# Patient Record
Sex: FEMALE | Race: WHITE | Employment: FULL TIME | URBAN - METROPOLITAN AREA
[De-identification: names, ages, dates, MRNs, and addresses within clinical notes are randomized per-mention and may not be internally consistent; named-entity substitution may affect disease eponyms.]

---

## 2019-05-26 ENCOUNTER — HOSPITAL ENCOUNTER (EMERGENCY)
Dept: GENERAL RADIOLOGY | Age: 44
Discharge: HOME | End: 2019-05-26
Attending: EMERGENCY MEDICINE
Payer: COMMERCIAL

## 2019-05-26 ENCOUNTER — HOSPITAL ENCOUNTER (EMERGENCY)
Age: 44
Discharge: HOME | End: 2019-05-26
Attending: EMERGENCY MEDICINE
Payer: COMMERCIAL

## 2019-05-26 VITALS
RESPIRATION RATE: 16 BRPM | TEMPERATURE: 97.9 F | OXYGEN SATURATION: 99 % | WEIGHT: 131 LBS | SYSTOLIC BLOOD PRESSURE: 136 MMHG | HEART RATE: 73 BPM | BODY MASS INDEX: 24.11 KG/M2 | DIASTOLIC BLOOD PRESSURE: 79 MMHG | HEIGHT: 62 IN

## 2019-05-26 DIAGNOSIS — M54.50 ACUTE RIGHT-SIDED LOW BACK PAIN WITHOUT SCIATICA: Primary | ICD-10-CM

## 2019-05-26 PROCEDURE — 72100 X-RAY EXAM L-S SPINE 2/3 VWS: CPT

## 2019-05-26 PROCEDURE — 99282 EMERGENCY DEPT VISIT SF MDM: CPT

## 2019-05-26 PROCEDURE — 73502 X-RAY EXAM HIP UNI 2-3 VIEWS: CPT

## 2019-05-26 RX ORDER — METHYLPREDNISOLONE 4 MG/1
TABLET ORAL
Qty: 1 DOSE PACK | Refills: 0 | Status: SHIPPED | OUTPATIENT
Start: 2019-05-26

## 2019-05-26 RX ORDER — ZINC GLUCONATE 10 MG
LOZENGE ORAL
COMMUNITY

## 2019-05-26 RX ORDER — HYDROCODONE BITARTRATE AND ACETAMINOPHEN 5; 325 MG/1; MG/1
1 TABLET ORAL
Qty: 18 TAB | Refills: 0 | Status: SHIPPED | OUTPATIENT
Start: 2019-05-26 | End: 2019-05-29

## 2019-05-26 RX ORDER — BISMUTH SUBSALICYLATE 262 MG
1 TABLET,CHEWABLE ORAL DAILY
COMMUNITY

## 2019-05-26 NOTE — ED NOTES
The patient was discharged by Dr Joshua [facility]. Patient wheeled out of ED for discharge.

## 2019-05-26 NOTE — DISCHARGE INSTRUCTIONS
Patient Education        Back Pain: Care Instructions  Your Care Instructions    Back pain has many possible causes. It is often related to problems with muscles and ligaments of the back. It may also be related to problems with the nerves, discs, or bones of the back. Moving, lifting, standing, sitting, or sleeping in an awkward way can strain the back. Sometimes you don't notice the injury until later. Arthritis is another common cause of back pain. Although it may hurt a lot, back pain usually improves on its own within several weeks. Most people recover in 12 weeks or less. Using good home treatment and being careful not to stress your back can help you feel better sooner. Follow-up care is a key part of your treatment and safety. Be sure to make and go to all appointments, and call your doctor if you are having problems. It's also a good idea to know your test results and keep a list of the medicines you take. How can you care for yourself at home? · Sit or lie in positions that are most comfortable and reduce your pain. Try one of these positions when you lie down:  ? Lie on your back with your knees bent and supported by large pillows. ? Lie on the floor with your legs on the seat of a sofa or chair. ? Lie on your side with your knees and hips bent and a pillow between your legs. ? Lie on your stomach if it does not make pain worse. · Do not sit up in bed, and avoid soft couches and twisted positions. Bed rest can help relieve pain at first, but it delays healing. Avoid bed rest after the first day of back pain. · Change positions every 30 minutes. If you must sit for long periods of time, take breaks from sitting. Get up and walk around, or lie in a comfortable position. · Try using a heating pad on a low or medium setting for 15 to 20 minutes every 2 or 3 hours. Try a warm shower in place of one session with the heating pad. · You can also try an ice pack for 10 to 15 minutes every 2 to 3 hours. Put a thin cloth between the ice pack and your skin. · Take pain medicines exactly as directed. ? If the doctor gave you a prescription medicine for pain, take it as prescribed. ? If you are not taking a prescription pain medicine, ask your doctor if you can take an over-the-counter medicine. · Take short walks several times a day. You can start with 5 to 10 minutes, 3 or 4 times a day, and work up to longer walks. Walk on level surfaces and avoid hills and stairs until your back is better. · Return to work and other activities as soon as you can. Continued rest without activity is usually not good for your back. · To prevent future back pain, do exercises to stretch and strengthen your back and stomach. Learn how to use good posture, safe lifting techniques, and proper body mechanics. When should you call for help? Call your doctor now or seek immediate medical care if:    · You have new or worsening numbness in your legs.     · You have new or worsening weakness in your legs. (This could make it hard to stand up.)     · You lose control of your bladder or bowels.    Watch closely for changes in your health, and be sure to contact your doctor if:    · You have a fever, lose weight, or don't feel well.     · You do not get better as expected. Where can you learn more? Go to http://monet-izzy.info/. Enter T840 in the search box to learn more about \"Back Pain: Care Instructions. \"  Current as of: September 20, 2018  Content Version: 11.9  © 9302-9100 [employer]. Care instructions adapted under license by [employer] (which disclaims liability or warranty for this information). If you have questions about a medical condition or this instruction, always ask your healthcare professional. [place] [address] any warranty or liability for your use of this information.

## 2019-05-26 NOTE — ED PROVIDER NOTES
Pt. Presents to the ER with low back/hip pain that started last night. Pt. [place] to  her daughter, who is 3, because the daughter was trying to pee outside in the back year. Pt. Scooped up her daughter and ran up the stairs with her. Pt. Says that by the time she got to the top step, her right lower back/hip began to hurt. Pt. Had similar pain 3 year ago when she was pregnant. Her pain has been constant since it started last night. Pt. Took an oxycodone which did not seem to help with the pain. No falls. No other injuries. Pt. Is able to walk, but she complains of pain when she walks  No changes with her urine or bowel movements. No new numbness, tingling or weakness.              Past Medical History:   Diagnosis Date    Abnormal Pap smear     hpv- 1st occurance leep 2nd occurance colpo     Abnormal Papanicolaou smear of cervix 2002     years ago HPV, cryo and colpo ()    Herpes simplex without mention of complication     cold sores-years ago    Infertility, female     ivf conception, G2 conceived naturally       Past Surgical History:   Procedure Laterality Date     DELIVERY ONLY  2014    Previa    HX OTHER SURGICAL      mold spores removed at the age of  17   Bernetta Marking OTHER SURGICAL      egg retrieval in 2013         Family History:   Problem Relation Age of Onset    Cancer Mother         ovarian cancer     Heart Disease Maternal Grandmother     Cancer Maternal Grandfather         testicular ca and blood and bone     Heart Disease Paternal Grandmother     Cancer Paternal Grandmother         lung ca       Social History     Socioeconomic History    Marital status:      Spouse name: Not on file    Number of children: Not on file    Years of education: Not on file    Highest education level: Not on file   Occupational History    Not on file   Social Needs    Financial resource strain: Not on file    Food insecurity:     Worry: Not on file     Inability: Not on file   Lsisette Awe Transportation needs:     Medical: Not on file     Non-medical: Not on file   Tobacco Use    Smoking status: Never Smoker    Smokeless tobacco: Never Used   Substance and Sexual Activity    Alcohol use: No    Drug use: No    Sexual activity: Yes     Partners: Male     Birth control/protection: Pill   Lifestyle    Physical activity:     Days per week: Not on file     Minutes per session: Not on file    Stress: Not on file   Relationships    Social connections:     Talks on phone: Not on file     Gets together: Not on file     Attends Tenriism service: Not on file     Active member of club or organization: Not on file     Attends meetings of clubs or organizations: Not on file     Relationship status: Not on file    Intimate partner violence:     Fear of current or ex partner: Not on file     Emotionally abused: Not on file     Physically abused: Not on file     Forced sexual activity: Not on file   Other Topics Concern    Not on file   Social History Narrative    Not on file         ALLERGIES: Nuts [tree nut] and Gluten    Review of Systems   Constitutional: Negative for chills and fever. HENT: Negative for rhinorrhea and sore throat. Respiratory: Negative for cough and shortness of breath. Cardiovascular: Negative for chest pain. Gastrointestinal: Negative for abdominal pain, diarrhea, nausea and vomiting. Genitourinary: Negative for dysuria and urgency. Musculoskeletal: Positive for back pain. Hip pain     Skin: Negative for rash. Neurological: Negative for dizziness, weakness and light-headedness. Vitals:    05/26/19 0915   BP: 136/79   Pulse: 73   Resp: 16   Temp: 97.9 °F (36.6 °C)   SpO2: 99%   Weight: 59.4 kg (131 lb)   Height: 5' 2\" (1.575 m)            Physical Exam     Vital signs reviewed. Nursing notes reviewed.     Const:  No acute distress, well developed, well nourished  Head:  Atraumatic, normocephalic  Eyes:  PERRL, conjunctiva normal, no scleral icterus  Neck: Supple, trachea midline  Cardiovascular:  RRR, no murmurs, no gallops, no rubs  Resp:  No resp distress, no increased work of breathing, no wheezes, no rhonchi, no rales,  Abd:  Soft, non-tender, non-distended, no rebound, no guarding, no CVA tenderness  MSK:  Focal tenderness over right SI joint, no tenderness over the midline L spine or lateral hip, pain improves with flexion at the hip  Neuro:  Alert and oriented x3, no cranial nerve defect  Skin:  Warm, dry, intact  Psych: normal mood and affect, behavior is normal, judgement and thought content is normal          MDM  Number of Diagnoses or Management Options  Acute right-sided low back pain without sciatica:      Amount and/or Complexity of Data Reviewed  Tests in the radiology section of CPT®: reviewed and ordered  Review and summarize past medical records: yes    Patient Progress  Patient progress: stable          Pt. Presents to the ER with pain at her SI joint. No fx on xray. No signs/sx of cord compression. Pt. Is well appearing in the ER. I will start her on norco and a medrol dosepak. Pt. To f/u with her PCP or return to the ER with worsening sx.       Procedures

## 2019-05-26 NOTE — ED TRIAGE NOTES
Last night pt lifted up her 1year old and after walking up 12 stairs she had pain in her right hip. She has difficulty standing and sitting. She reports the pain being sharp. She reports having a right hip issue while pregnant. She took some Oxycodone left over from a  with no relief.

## 2024-11-05 ENCOUNTER — OFFICE VISIT (OUTPATIENT)
Age: 49
End: 2024-11-05

## 2024-11-05 VITALS
BODY MASS INDEX: 25.76 KG/M2 | RESPIRATION RATE: 15 BRPM | WEIGHT: 140 LBS | SYSTOLIC BLOOD PRESSURE: 121 MMHG | OXYGEN SATURATION: 96 % | TEMPERATURE: 98.4 F | HEART RATE: 73 BPM | HEIGHT: 62 IN | DIASTOLIC BLOOD PRESSURE: 84 MMHG

## 2024-11-05 DIAGNOSIS — J02.9 ACUTE VIRAL PHARYNGITIS: Primary | ICD-10-CM

## 2024-11-05 LAB — S PYO AG THROAT QL: NORMAL

## 2024-11-05 RX ORDER — NORGESTIMATE AND ETHINYL ESTRADIOL
KIT
COMMUNITY
Start: 2024-11-05

## 2024-11-05 NOTE — PATIENT INSTRUCTIONS
Thank you for visiting [facility] today.    Limit your exposure to allergens such as pollen, dust, animal dander and cigarette smoke  Use an air purifier or air conditioner in the home  Flonase or Nasonex may be used twice daily in each nostril  Claritin, Allegra, Xyzal or Zyrtec every 12 -24 hours  Allergist referral if no improvement in symptoms within 5-7 days

## 2024-11-08 PROBLEM — Z80.41 FAMILY HISTORY OF MALIGNANT NEOPLASM OF OVARY: Status: ACTIVE | Noted: 2019-06-21

## 2024-11-08 PROBLEM — R14.0 ABDOMINAL BLOATING: Status: ACTIVE | Noted: 2019-06-21

## 2024-11-08 PROBLEM — R10.2 PAIN IN PELVIS: Status: ACTIVE | Noted: 2020-01-21

## 2024-11-08 PROBLEM — R53.83 FATIGUE: Status: ACTIVE | Noted: 2020-01-21

## 2024-11-08 PROBLEM — D25.9 UTERINE LEIOMYOMA: Status: ACTIVE | Noted: 2020-01-29

## 2024-11-08 ASSESSMENT — ENCOUNTER SYMPTOMS: SORE THROAT: 1

## 2024-11-08 NOTE — PROGRESS NOTES
Subjective     Chief Complaint   Patient presents with    Pharyngitis     Pt's son and  have both tested positive for strep in the past week. C/o bilateral ear fullness, discomfort in throat since today. She's taken tylenol, zyrtec, and ibuprofen.       Patient is a 49-year-old female presenting with concern for strep throat.  She reports both her son and  have recently been diagnosed with same.  She reports symptoms began today.  She also reports fullness in the ears.  She has been taking Zyrtec, Tylenol and ibuprofen with some relief.  No fever or chills.      Pharyngitis  Associated symptoms: ear pain and sore throat    Associated symptoms: no fever        Past Medical History:   Diagnosis Date    Abnormal Pap smear     hpv- 1st occurance leep 2nd occurance colpo     Abnormal Papanicolaou smear of cervix 2002     years ago HPV, cryo and colpo ()    Herpes simplex without mention of complication     cold sores-years ago    Infertility, female     ivf conception, G2 conceived naturally       Past Surgical History:   Procedure Laterality Date     DELIVERY ONLY  2014    Previa    HEENT  2019    gum grafting    OTHER SURGICAL HISTORY      mold spores removed at the age of  14    OTHER SURGICAL HISTORY      egg retrieval in 2013    SEPTOPLASTY  2017       Family History   Problem Relation Age of Onset    Heart Disease Paternal Grandmother     Cancer Paternal Grandmother         lung ca    Cancer Mother         ovarian cancer     Heart Disease Maternal Grandmother     Cancer Maternal Grandfather         testicular ca and blood and bone        Allergies   Allergen Reactions    Justicia Adhatoda Anaphylaxis    Egg-Derived Products        Social History     Tobacco Use    Smoking status: Never    Smokeless tobacco: Never   Vaping Use    Vaping status: Never Used   Substance Use Topics    Alcohol use: Yes    Drug use: No       Vitals:    24 1740   BP: 121/84   Pulse: 73   Resp: 15   Temp: